# Patient Record
Sex: FEMALE | Race: OTHER | HISPANIC OR LATINO | URBAN - METROPOLITAN AREA
[De-identification: names, ages, dates, MRNs, and addresses within clinical notes are randomized per-mention and may not be internally consistent; named-entity substitution may affect disease eponyms.]

---

## 2024-07-27 ENCOUNTER — EMERGENCY (EMERGENCY)
Facility: HOSPITAL | Age: 29
LOS: 1 days | Discharge: HOME | End: 2024-07-27
Attending: EMERGENCY MEDICINE
Payer: MEDICAID

## 2024-07-27 VITALS
RESPIRATION RATE: 18 BRPM | HEIGHT: 68 IN | HEART RATE: 85 BPM | WEIGHT: 141.1 LBS | OXYGEN SATURATION: 100 % | SYSTOLIC BLOOD PRESSURE: 114 MMHG | DIASTOLIC BLOOD PRESSURE: 72 MMHG | TEMPERATURE: 98 F

## 2024-07-27 PROCEDURE — 99283 EMERGENCY DEPT VISIT LOW MDM: CPT

## 2024-07-27 PROCEDURE — 99282 EMERGENCY DEPT VISIT SF MDM: CPT

## 2024-07-27 NOTE — ED PROVIDER NOTE - PATIENT PORTAL LINK FT
You can access the FollowMyHealth Patient Portal offered by [facility] by registering at the following website: [email or web address]. By joining [employer]’s FollowMyHealth portal, you will also be able to view your health information using other applications (apps) compatible with our system.

## 2024-07-27 NOTE — ED PROVIDER NOTE - CLINICAL SUMMARY MEDICAL DECISION MAKING FREE TEXT BOX
Anand Roberts, PGY3 -      This is a 29 year old female with no significant pmh presenting with cough x 4 days and sore thraot, diagnosed with group C strep, on amoxicillin x 4days and hives around neck and R shoulder for the past few days. Has been taking zyrtec and Benadryl without much relief. No fever/chills. No nausea/vomiting. No chest pain/shortness of breath. No abdominal pain. No urinary symptoms. Has been trying cough suppressants. Vitals wnl. Physical exam shows well appearing female. Coughing without sputum production. No crackles/wheezing. Not in acute respiratory distress. Rash noted on posterior neck, do not appear cellulitis. Appears urticarial. Small spot of urticaria on R shoulder. Most likely viral illness. Offered viral swab, but had shared decision making and opted not to obtain. No indication for cxr to look for pneumonia. as patient well appearing and no fever. No indication for steroids as patient well appearing and no other medical conditions and rash quite limited and not diffuse. Dispo home w/ pcp f/u.

## 2024-07-27 NOTE — ED PROVIDER NOTE - ATTENDING CONTRIBUTION TO CARE
29 year old female with no significant pmh presenting with cough x 4 days and sore thraot, diagnosed with group C strep, on amoxicillin x 4days and hives around neck and R shoulder for the past few days. Has been taking zyrtec and Benadryl without much relief. No fever/chills. No nausea/vomiting. No chest pain/shortness of breath. No abdominal pain. No urinary symptoms. Has been trying cough suppressants. Vitals wnl. Physical exam shows well appearing female. Coughing without sputum production. No crackles/wheezing. Not in acute respiratory distress. Rash noted on posterior neck, do not appear cellulitis. Appears urticarial. Small spot of urticaria on R shoulder. Most likely viral illness. Offered viral swab, but had shared decision making and opted not to obtain. No indication for cxr to look for pneumonia. as patient well appearing and no fever. No indication for steroids as patient well appearing and no other medical conditions and rash quite limited and not diffuse. Dispo home w/ pcp f/u.  agree with above    keerthi

## 2024-07-27 NOTE — ED ADULT TRIAGE NOTE - CHIEF COMPLAINT QUOTE
Dx with strep throat, taking amoxicillin x 4 days. Cough and general body hives x 1 week. Denies CP, SOB.

## 2024-07-27 NOTE — ED ADULT NURSE NOTE - OBJECTIVE STATEMENT
Patient presented to ED with cough and general body hives x 1 week. Patient has strep throat and prescribed with Amoxicillin. Denies any SOB/CP.

## 2024-07-27 NOTE — ED PROVIDER NOTE - NSFOLLOWUPINSTRUCTIONS_ED_ALL_ED_FT
YOU WERE SEEN FOR urticaria    YOU were examined by emergency physicians.     FOLLOW UP WITH YOUR PRIMARY CARE PROVIDER    RETURN TO THE EMERGENCY DEPARTMENT FOR fever, vomiting, or any new/concerning symptoms.